# Patient Record
Sex: FEMALE | Race: WHITE | NOT HISPANIC OR LATINO | Employment: UNEMPLOYED | URBAN - METROPOLITAN AREA
[De-identification: names, ages, dates, MRNs, and addresses within clinical notes are randomized per-mention and may not be internally consistent; named-entity substitution may affect disease eponyms.]

---

## 2021-08-02 ENCOUNTER — VBI (OUTPATIENT)
Dept: ADMINISTRATIVE | Facility: OTHER | Age: 36
End: 2021-08-02

## 2021-08-02 NOTE — TELEPHONE ENCOUNTER
Upon review of the In Basket request we were able to locate, review, and update the patient chart as requested for Pap Smear (HPV) aka Cervical Cancer Screening  Any additional questions or concerns should be emailed to the Practice Liaisons via [email or web address] email, please do not reply via In Basket      Thank you  Madai Sylvester

## 2022-11-29 ENCOUNTER — ANNUAL EXAM (OUTPATIENT)
Dept: OBGYN CLINIC | Facility: CLINIC | Age: 37
End: 2022-11-29

## 2022-11-29 VITALS
HEIGHT: 66 IN | BODY MASS INDEX: 19.93 KG/M2 | DIASTOLIC BLOOD PRESSURE: 72 MMHG | SYSTOLIC BLOOD PRESSURE: 118 MMHG | WEIGHT: 124 LBS

## 2022-11-29 DIAGNOSIS — Z30.41 SURVEILLANCE OF CONTRACEPTIVE PILL: ICD-10-CM

## 2022-11-29 DIAGNOSIS — Z01.419 GYNECOLOGIC EXAM NORMAL: Primary | ICD-10-CM

## 2022-11-29 RX ORDER — ACETAMINOPHEN AND CODEINE PHOSPHATE 120; 12 MG/5ML; MG/5ML
1 SOLUTION ORAL DAILY
Qty: 90 TABLET | Refills: 1 | Status: SHIPPED | OUTPATIENT
Start: 2022-11-29

## 2022-11-29 NOTE — PROGRESS NOTES
Assessment/Plan   Problem List Items Addressed This Visit        Other    Gynecologic exam normal - Primary     Pap guidelines reviewed  Pap with HPV done today  Will plan to start mammograms at age 34  Reviewed birth control options, patient most comfortable with progesterone only options at this time secondary to recently found hyperlipidemia  Reviewed progesterone only pill, Nexplanon, Mirena/Sklya/Kyleena and Paragard IUD  Patient decided on progesterone only pill  Reviewed when to start, what to do if misses pill  Recommend using condoms for the 1st month on the pill, if misses more than 2 pills in the pack, if on antibiotics and for STD prevention  Reviewed common side effects of the pill including nausea, vomiting, breast pain, bloating, fatigue, mood swings, weight gain, and increased acne  Reassured side effects typically diminish in the first month or two on the pill  Will return to office in 3 months for pill check  Relevant Orders    IGP, Aptima HPV, Rfx 16/18,45 (Completed)   Other Visit Diagnoses     Surveillance of contraceptive pill        Relevant Medications    norethindrone (Caroline-BE) 0 35 MG tablet          Subjective:     Patient ID: Sania Ventura is a 42 y o  y o  female  HPI  43 yo seen for annual exam  Reports menses are regular with no concerns  Would like to discuss birth control options  Previously on Ortho Tricyclen in the past  Recently found out she had high cholesterol and calcium, currently being evaluated by endocrinology  Denies smoking  No family or personal history of blood clotting disorders, denies migraines  Denies bowel or bladder issues  Last pap: 1/11/2019 NILM  The following portions of the patient's history were reviewed and updated as appropriate:   She  has a past medical history of Anxiety, Fibrous breast lumps (2013), Hypertension (8/22), and Papanicolaou smear for cervical cancer screening (2019)    She   Patient Active Problem List Diagnosis Date Noted   • Gynecologic exam normal 2022     She  has a past surgical history that includes AUGMENTATION BREAST ()  Her family history includes Brain cancer in her mother; Diabetes in her maternal grandmother  She  reports that she has never smoked  She does not have any smokeless tobacco history on file  She reports current alcohol use of about 3 0 standard drinks per week  She reports that she does not use drugs  Current Outpatient Medications   Medication Sig Dispense Refill   • norethindrone (Caroline-BE) 0 35 MG tablet Take 1 tablet (0 35 mg total) by mouth daily 90 tablet 1     No current facility-administered medications for this visit  She is allergic to latex       Menstrual History:  OB History        1    Para        Term                AB        Living   0       SAB        IAB        Ectopic        Multiple        Live Births                      Patient's last menstrual period was 2022 (approximate)  Review of Systems   Constitutional: Negative for fatigue, fever and unexpected weight change  HENT: Negative for dental problem and sinus pressure  Eyes: Negative for visual disturbance  Respiratory: Negative for cough, shortness of breath and wheezing  Cardiovascular: Negative for chest pain  Gastrointestinal: Negative for abdominal pain, blood in stool, constipation, diarrhea, nausea and vomiting  Endocrine: Negative for polydipsia  Genitourinary: Negative for difficulty urinating, dyspareunia, dysuria, frequency, hematuria, pelvic pain and urgency  Musculoskeletal: Negative for arthralgias and back pain  Neurological: Negative for dizziness, seizures, light-headedness and headaches  Psychiatric/Behavioral: Negative for suicidal ideas  The patient is not nervous/anxious          Objective:  Vitals:    22 1135   BP: 118/72   BP Location: Left arm   Patient Position: Sitting   Cuff Size: Standard   Weight: 56 2 kg (124 lb) Height: 5' 6 25" (1 683 m)      Physical Exam  Constitutional:       Appearance: Normal appearance  She is well-developed  Genitourinary:      Vulva and bladder normal       No lesions in the vagina  Right Labia: No rash, tenderness, lesions or skin changes  Left Labia: No tenderness, lesions, skin changes or rash  No labial fusion noted  No inguinal adenopathy present in the right or left side  No vaginal discharge, erythema, tenderness or bleeding  Right Adnexa: not tender, not full and no mass present  Left Adnexa: not tender, not full and no mass present  No cervical motion tenderness, discharge or lesion  Uterus is not enlarged, tender or irregular  No uterine mass detected  No urethral prolapse, tenderness or mass present  Bladder is not tender  Breasts:     Breasts are symmetrical       Right: No swelling, bleeding, inverted nipple, mass, nipple discharge, skin change or tenderness  Left: No swelling, bleeding, inverted nipple, mass, nipple discharge, skin change or tenderness  HENT:      Head: Normocephalic and atraumatic  Neck:      Thyroid: No thyromegaly  Cardiovascular:      Rate and Rhythm: Normal rate and regular rhythm  Heart sounds: Normal heart sounds  No murmur heard  No friction rub  No gallop  Pulmonary:      Effort: Pulmonary effort is normal  No respiratory distress  Breath sounds: Normal breath sounds  No wheezing  Abdominal:      General: There is no distension  Palpations: Abdomen is soft  There is no mass  Tenderness: There is no abdominal tenderness  There is no guarding or rebound  Hernia: No hernia is present  Lymphadenopathy:      Cervical: No cervical adenopathy  Upper Body:      Right upper body: No supraclavicular, axillary or pectoral adenopathy  Left upper body: No supraclavicular, axillary or pectoral adenopathy  Lower Body: No right inguinal adenopathy   No left inguinal adenopathy  Neurological:      Mental Status: She is alert and oriented to person, place, and time  Skin:     General: Skin is warm and dry     Psychiatric:         Behavior: Behavior normal

## 2022-12-06 LAB
CYTOLOGIST CVX/VAG CYTO: NORMAL
DX ICD CODE: NORMAL
HPV GENOTYPE REFLEX: NORMAL
HPV I/H RISK 4 DNA CVX QL PROBE+SIG AMP: NEGATIVE
OTHER STN SPEC: NORMAL
PATH REPORT.FINAL DX SPEC: NORMAL
SL AMB NOTE:: NORMAL
SL AMB SPECIMEN ADEQUACY: NORMAL
SL AMB TEST METHODOLOGY: NORMAL

## 2023-01-28 PROBLEM — Z01.419 GYNECOLOGIC EXAM NORMAL: Status: RESOLVED | Noted: 2022-11-29 | Resolved: 2023-01-28

## 2023-03-02 ENCOUNTER — TELEPHONE (OUTPATIENT)
Dept: OBGYN CLINIC | Facility: CLINIC | Age: 38
End: 2023-03-02

## 2023-03-02 NOTE — TELEPHONE ENCOUNTER
Pilar correa on 3/1/2023 requesting alternate OCP rx  She has been breakiing out with her current OCP and wants to switch    Ghazala, please advise

## 2023-03-02 NOTE — TELEPHONE ENCOUNTER
LMOM to further discuss with patient  Was switched from estrogen combined to progesterone only pill secondary to recently diagnosed hyperlipidemia and patient's desire to switch to something safer  Can review considering low dose pill if understands potential risks and hyperlipidemia is improving vs follow up with dermatology to discuss other options for her skin

## 2023-03-22 ENCOUNTER — TELEPHONE (OUTPATIENT)
Dept: OBGYN CLINIC | Facility: CLINIC | Age: 38
End: 2023-03-22

## 2023-03-22 DIAGNOSIS — L70.0 ACNE VULGARIS: Primary | ICD-10-CM

## 2023-03-22 RX ORDER — NORETHINDRONE ACETATE AND ETHINYL ESTRADIOL 1MG-20(21)
1 KIT ORAL DAILY
Qty: 90 TABLET | Refills: 1 | Status: SHIPPED | OUTPATIENT
Start: 2023-03-22

## 2023-03-22 NOTE — TELEPHONE ENCOUNTER
Sent an email to email on file reviewing with patient that we are unable to get a hold of her  Recommend she call the office back or sign up for MyChart  Had  Quinn Last send a Mychart invitation to patient  Email attached below:     Alexi Sender,     [address] [facility] office has been trying to reach you to return your call and answer your medical question  I think we may be having some phone issues  I called you on 3/2/2023, 3/3/2023, 3/8/2023 after receiving your first voicemail  Your phone unfortunately went straight to voicemail and I left 3 separate voicemails and hadn't heard back  We got your message again today and I have tried to call you once and the nurses have also tried to call you  The phone consistently goes straight to voicemail, it does state “ You have reached [facility], please leave a message” so I feel like we have the right phone number  I'm not sure if your phone thinks it is potential spam and is blocking our calls or what the issue may be  So I figured I would reach out to you here  I am in the office today until 6pm please call the office and ask for Ghazala  If there is a better phone number to call you back please provide to the staff  I also highly encourage signing up for MyChart and we can communicate there as well  I had the staff send you another MyChart invite to your email  I am unable to discuss your medical concerns over email as it is not a secured form of communication that is HIPAA compliant but I would be happy to discuss over the phone or through [address]  I hope to hear from you soon!     Meli Patterson PA-C  [address]  [phone]

## 2023-03-22 NOTE — TELEPHONE ENCOUNTER
Tom Petersen left a message stating has left three messages about changing her birth control pills and no one has returned her phone calls  It is noted that Ghazala has attempted to speak with Pilar,but phone calls go to [address]  voice mail  Left another voice mail for pt as to Ghazala's attempts  Will send another message  to OZZY Faust [address]  Gerhardt Sep

## 2023-03-22 NOTE — TELEPHONE ENCOUNTER
Patient called office  Reports since starting progesterone only pill her acne has been worsening  Has been on Ortho tricyclen in the past  Was off for a period of time  Reports wanted to try progesterone only option secondary to recently being diagnosed with hyperlipidemia  Patient reports since that visit has had cholesterol rechecked and LDL mildly high but HDL is also high and was told not at increased risk of cardiovascular event and did not recommend medication  Following closely with PCP  Reports would like to go on an estrogen combined pill that will work better for her acne  Will plan to trial Loestrin Fe 1/20  Reviewed when to start, what to do if misses pill  Recommend using condoms for the 1st month on the pill, if misses more than 2 pills in the pack, if on antibiotics and for STD prevention  Reviewed common side effects of the pill including nausea, vomiting, breast pain, bloating, fatigue, mood swings, weight gain, and increased acne  Reassured side effects typically diminish in the first month or two on the pill  Reviewed clotting risk and signs and symptoms of pulmonary embolism, DVT, myocardial infarction, stroke  Patient understands potential risks and will plan to start this weekend  Will return to office in 3 months for pill check or call in a blood pressure value in 3 months if doing well